# Patient Record
Sex: FEMALE | Race: WHITE | NOT HISPANIC OR LATINO | URBAN - METROPOLITAN AREA
[De-identification: names, ages, dates, MRNs, and addresses within clinical notes are randomized per-mention and may not be internally consistent; named-entity substitution may affect disease eponyms.]

---

## 2023-05-03 ENCOUNTER — OFFICE VISIT (OUTPATIENT)
Dept: PEDIATRICS | Facility: CLINIC | Age: 18
End: 2023-05-03
Payer: COMMERCIAL

## 2023-05-03 VITALS — WEIGHT: 148 LBS | BODY MASS INDEX: 26.22 KG/M2

## 2023-05-03 DIAGNOSIS — N30.01 ACUTE CYSTITIS WITH HEMATURIA: Primary | ICD-10-CM

## 2023-05-03 LAB
POC APPEARANCE, URINE: CLEAR
POC BILIRUBIN, URINE: NEGATIVE
POC BLOOD, URINE: ABNORMAL
POC COLOR, URINE: ABNORMAL
POC GLUCOSE, URINE: NEGATIVE MG/DL
POC KETONES, URINE: NEGATIVE MG/DL
POC LEUKOCYTES, URINE: ABNORMAL
POC NITRITE,URINE: NEGATIVE
POC PH, URINE: 5.5 PH
POC PROTEIN, URINE: ABNORMAL MG/DL
POC SPECIFIC GRAVITY, URINE: >=1.03
POC UROBILINOGEN, URINE: 0.2 EU/DL

## 2023-05-03 PROCEDURE — 87086 URINE CULTURE/COLONY COUNT: CPT

## 2023-05-03 PROCEDURE — 99213 OFFICE O/P EST LOW 20 MIN: CPT | Performed by: PEDIATRICS

## 2023-05-03 PROCEDURE — 81003 URINALYSIS AUTO W/O SCOPE: CPT | Performed by: PEDIATRICS

## 2023-05-03 RX ORDER — SULFAMETHOXAZOLE AND TRIMETHOPRIM 800; 160 MG/1; MG/1
1 TABLET ORAL 2 TIMES DAILY
Qty: 14 TABLET | Refills: 0 | Status: SHIPPED | OUTPATIENT
Start: 2023-05-03 | End: 2023-05-10

## 2023-05-03 RX ORDER — CYANOCOBALAMIN 1000 UG/ML
1000 INJECTION, SOLUTION INTRAMUSCULAR; SUBCUTANEOUS ONCE
Status: CANCELLED | OUTPATIENT
Start: 2023-05-03 | End: 2023-05-03

## 2023-05-03 NOTE — PROGRESS NOTES
Subjective   Patient ID: Ania Ann is a 17 y.o. female who presents for UTI.  Today she is accompanied by accompanied by self .     HPI    Pt c/o dysuria x 1 - 2 days  No fevers  + urgency  + frequency    LMP was 1 week ago    Sexual intercourse 3 days ago  No h/o UTI    Review of systems negative unless otherwise indicated in HPI    Objective   Wt 67.1 kg (148 lb)   BMI 26.22 kg/m²     Physical Exam  General: alert, active, in no acute distress  Lungs: clear to auscultation, no wheezing, crackles or rhonchi, breathing unlabored  Heart: Normal PMI. regular rate and rhythm, normal S1, S2, no murmurs or gallops.     Assessment/Plan   Problem List Items Addressed This Visit    None  Visit Diagnoses       Acute cystitis with hematuria    -  Primary    Relevant Medications    sulfamethoxazole-trimethoprim (Bactrim DS) 800-160 mg tablet    Other Relevant Orders    POCT UA Automated manually resulted    Urine Culture        Afebrile UTI  Oral abx  Urine cx  Report worse or not improved    Esperanza Mendoza MD

## 2023-05-04 LAB — URINE CULTURE: NORMAL

## 2024-02-06 PROBLEM — F41.9 ANXIETY: Status: ACTIVE | Noted: 2024-02-06

## 2024-02-06 PROBLEM — N83.209 SIMPLE OVARIAN CYST: Status: ACTIVE | Noted: 2024-02-06

## 2024-02-06 PROBLEM — R10.9 ABDOMINAL PAIN: Status: ACTIVE | Noted: 2024-02-06

## 2024-02-06 PROBLEM — T14.90XA TRAUMA: Status: ACTIVE | Noted: 2021-02-22
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
## 2024-02-06 PROBLEM — R11.10 VOMITING: Status: ACTIVE | Noted: 2024-02-06

## 2024-02-06 RX ORDER — IBUPROFEN 400 MG/1
400 TABLET ORAL
COMMUNITY
Start: 2021-08-06

## 2024-02-06 RX ORDER — ONDANSETRON 4 MG/1
4 TABLET, ORALLY DISINTEGRATING ORAL
COMMUNITY
Start: 2021-08-06

## 2024-02-06 RX ORDER — SERTRALINE HYDROCHLORIDE 50 MG/1
50 TABLET, FILM COATED ORAL DAILY
COMMUNITY
Start: 2023-04-12

## 2024-02-06 RX ORDER — ONDANSETRON 8 MG/1
TABLET, ORALLY DISINTEGRATING ORAL
COMMUNITY
Start: 2021-11-11

## 2024-02-06 RX ORDER — NORETHINDRONE ACETATE AND ETHINYL ESTRADIOL 1MG-20(21)
1 KIT ORAL
COMMUNITY
Start: 2021-08-11

## 2024-02-20 ENCOUNTER — APPOINTMENT (OUTPATIENT)
Dept: PEDIATRICS | Facility: CLINIC | Age: 19
End: 2024-02-20
Payer: COMMERCIAL

## 2024-04-10 ENCOUNTER — OFFICE VISIT (OUTPATIENT)
Dept: PEDIATRICS | Facility: CLINIC | Age: 19
End: 2024-04-10
Payer: COMMERCIAL

## 2024-04-10 VITALS — TEMPERATURE: 98 F | WEIGHT: 150.8 LBS | BODY MASS INDEX: 26.71 KG/M2

## 2024-04-10 DIAGNOSIS — J02.9 SORE THROAT: Primary | ICD-10-CM

## 2024-04-10 DIAGNOSIS — R68.89 FLU-LIKE SYMPTOMS: ICD-10-CM

## 2024-04-10 LAB — POC RAPID STREP: NEGATIVE

## 2024-04-10 PROCEDURE — 99213 OFFICE O/P EST LOW 20 MIN: CPT | Performed by: PEDIATRICS

## 2024-04-10 PROCEDURE — 87880 STREP A ASSAY W/OPTIC: CPT | Performed by: PEDIATRICS

## 2024-04-10 PROCEDURE — 87651 STREP A DNA AMP PROBE: CPT

## 2024-04-10 NOTE — LETTER
April 10, 2024     Patient: Ania Ann   YOB: 2005   Date of Visit: 4/10/2024       To Whom It May Concern:    Ania Ann was seen in my clinic on 4/10/2024 at 11:00 am. Please excuse Ania for her absence from school on this day to make the appointment.    Ania has been diagnosed with influenza. She has been instructed not to return to work or school until she is fever free for 24 hours and improved.    If you have any questions or concerns, please don't hesitate to call.         Sincerely,         Esperanza Mendoza MD        CC: No Recipients

## 2024-04-10 NOTE — PROGRESS NOTES
Subjective   Patient ID: Ania Ann is a 18 y.o. female who presents for Sore Throat, Fever, and Cough.  Today she is accompanied by self.     HPI    Fever began overnight Monday 4/8  Body aches  Congestion  HA  Cough  Sore throat  Not eating  Drinking water- making urine    Review of systems negative unless otherwise indicated in HPI    Objective   Temp 36.7 °C (98 °F)   Wt 68.4 kg (150 lb 12.8 oz)   BMI 26.71 kg/m²     Physical Exam  General: alert, active, in no acute distress  Hydration: well-hydrated, mucous membranes moist, good skin turgor  Eyes: conjunctiva clear  Ears: TM's normal, external auditory canals are clear   Nose: clear, no discharge  Throat: moist mucous membranes without erythema, exudates or petechiae, no post-nasal drainage seen  Neck: no lymphadenopathy  Lungs: clear to auscultation, no wheezing, crackles or rhonchi, breathing unlabored  Heart: Normal PMI. regular rate and rhythm, normal S1, S2, no murmurs or gallops.     Assessment/Plan   Problem List Items Addressed This Visit    None  Visit Diagnoses       Sore throat    -  Primary    Relevant Orders    POCT rapid strep A manually resulted (Completed)    Group A Streptococcus, PCR    Flu-like symptoms              Pt clinically diagnosed with the flu- strep ruled out  Supportive Care  Call if worse, not improved, fever > 4-5 days  Strep PCR      Esperanza Mendoza MD

## 2024-04-11 LAB — S PYO DNA THROAT QL NAA+PROBE: NOT DETECTED

## 2024-11-26 ENCOUNTER — OFFICE VISIT (OUTPATIENT)
Dept: PEDIATRICS | Facility: CLINIC | Age: 19
End: 2024-11-26
Payer: COMMERCIAL

## 2024-11-26 DIAGNOSIS — N30.01 ACUTE CYSTITIS WITH HEMATURIA: ICD-10-CM

## 2024-11-26 DIAGNOSIS — R30.0 DYSURIA: Primary | ICD-10-CM

## 2024-11-26 LAB
POC APPEARANCE, URINE: CLEAR
POC BILIRUBIN, URINE: NEGATIVE
POC BLOOD, URINE: ABNORMAL
POC COLOR, URINE: YELLOW
POC GLUCOSE, URINE: NEGATIVE MG/DL
POC KETONES, URINE: NEGATIVE MG/DL
POC LEUKOCYTES, URINE: ABNORMAL
POC NITRITE,URINE: NEGATIVE
POC PH, URINE: 5.5 PH
POC PROTEIN, URINE: NEGATIVE MG/DL
POC SPECIFIC GRAVITY, URINE: >=1.03
POC UROBILINOGEN, URINE: 0.2 EU/DL

## 2024-11-26 PROCEDURE — 81003 URINALYSIS AUTO W/O SCOPE: CPT | Performed by: PEDIATRICS

## 2024-11-26 PROCEDURE — 87086 URINE CULTURE/COLONY COUNT: CPT

## 2024-11-26 PROCEDURE — 99213 OFFICE O/P EST LOW 20 MIN: CPT | Performed by: PEDIATRICS

## 2024-11-26 RX ORDER — SULFAMETHOXAZOLE AND TRIMETHOPRIM 800; 160 MG/1; MG/1
1 TABLET ORAL 2 TIMES DAILY
Qty: 14 TABLET | Refills: 0 | Status: SHIPPED | OUTPATIENT
Start: 2024-11-26 | End: 2024-12-03

## 2024-11-26 NOTE — PROGRESS NOTES
"Subjective   Patient ID: Ania Ann is a 19 y.o. female who presents for No chief complaint on file..  HPI  UTI abut a year and a half ago- feeling the same  Started 3 days ago with dysuria and urgency  A little odor, some terminal hematuria  No V, no back pain, no abd pain, no fever  Recent sexual activity  Review of Systems    Objective   Physical Exam  Constitutional:       Appearance: Normal appearance.   Abdominal:      Comments: No CVAT, mild suprapubic discomfort   Neurological:      Mental Status: She is alert.         Assessment/Plan        Abnl UA with + sx of \"honeymoon cystitis\" will check U culture and treat with 1 week of Bactrim DS    Tamika Escalante MD 11/26/24 4:30 PM   "

## 2024-11-27 LAB — BACTERIA UR CULT: NORMAL

## 2025-05-15 ENCOUNTER — APPOINTMENT (OUTPATIENT)
Dept: PEDIATRICS | Facility: CLINIC | Age: 20
End: 2025-05-15
Payer: COMMERCIAL

## 2025-05-16 ENCOUNTER — APPOINTMENT (OUTPATIENT)
Dept: PEDIATRICS | Facility: CLINIC | Age: 20
End: 2025-05-16
Payer: COMMERCIAL